# Patient Record
Sex: FEMALE | Race: WHITE | Employment: PART TIME | URBAN - METROPOLITAN AREA
[De-identification: names, ages, dates, MRNs, and addresses within clinical notes are randomized per-mention and may not be internally consistent; named-entity substitution may affect disease eponyms.]

---

## 2019-05-17 PROCEDURE — 87624 HPV HI-RISK TYP POOLED RSLT: CPT | Performed by: OBSTETRICS & GYNECOLOGY

## 2019-05-17 PROCEDURE — 88175 CYTOPATH C/V AUTO FLUID REDO: CPT | Performed by: OBSTETRICS & GYNECOLOGY

## 2024-06-03 ENCOUNTER — OFFICE VISIT (OUTPATIENT)
Facility: CLINIC | Age: 49
End: 2024-06-03
Payer: COMMERCIAL

## 2024-06-03 VITALS
HEIGHT: 67 IN | DIASTOLIC BLOOD PRESSURE: 76 MMHG | BODY MASS INDEX: 26.06 KG/M2 | WEIGHT: 166 LBS | SYSTOLIC BLOOD PRESSURE: 112 MMHG

## 2024-06-03 DIAGNOSIS — Z80.3 FAMILY HISTORY OF BREAST CANCER: ICD-10-CM

## 2024-06-03 DIAGNOSIS — Z12.31 ENCOUNTER FOR SCREENING MAMMOGRAM FOR BREAST CANCER: Primary | ICD-10-CM

## 2024-06-03 DIAGNOSIS — Z12.4 CERVICAL CANCER SCREENING: ICD-10-CM

## 2024-06-03 DIAGNOSIS — Z00.00 ANNUAL PHYSICAL EXAM: ICD-10-CM

## 2024-06-03 PROCEDURE — 87624 HPV HI-RISK TYP POOLED RSLT: CPT | Performed by: OBSTETRICS & GYNECOLOGY

## 2024-06-03 PROCEDURE — 88175 CYTOPATH C/V AUTO FLUID REDO: CPT | Performed by: OBSTETRICS & GYNECOLOGY

## 2024-06-03 NOTE — PROGRESS NOTES
GYN H&P     Genetic questionnaire reviewed with the patient and she will be referred for genetic counseling if the questionnaire had any positive results.    The [facility] intake form was also reviewed regarding contraception, menstrual periods, urinary health, and vaginal / sexual health    6/3/2024  4:29 PM    Chief Complaint   Patient presents with    Physical     No concerns.       HPI: Rosa is a 48 year old  Patient's last menstrual period was 2024 (approximate).  (contraception: condoms) here for her annual gyn exam.     She has no complaints.   Menses are regular. Denies any pelvic or breast complaints.   Satisfied with current contraception.     New to us        Previous encounters and chart reviewed.     OB History    Para Term  AB Living   3 3 3 0 0 3   SAB IAB Ectopic Multiple Live Births   0 0 0 0 3      # Outcome Date GA Lbr Dylan/2nd Weight Sex Type Anes PTL Lv   3 Term 09 39w0d  9 lb 10 oz (4.366 kg) F NORMAL SPONT   JUAN FRANCISCO      Birth Comments: gdm   2 Term 07 39w0d  7 lb 8 oz (3.402 kg) F NORMAL SPONT   JUAN FRANCISCO      Birth Comments: gdm   1 Term 10/27/05 39w0d  7 lb 9 oz (3.43 kg) F NORMAL SPONT   JUAN FRANCISCO      Birth Comments: gdm     Hx of gestational diabetes     GYN hx:   Menarche: 12  Period Cycle (Days): 28  Period Duration (Days): 2-3  Period Flow: light  Use of Birth Control (if yes, specify type): Condoms  Pap Date: 19  Pap Result Notes: neg/neg  Follow Up Recommendation: due      Past Medical History:    ASTHMA    Chronic rhinitis    Extrinsic asthma, unspecified    Gestational diabetes (HCC)    x3    SEASONAL ALLERGIES     Past Surgical History:   Procedure Laterality Date    Other surgical history      left knee acl repair    Tonsillectomy       No Known Allergies  Current Outpatient Medications on File Prior to Visit   Medication Sig Dispense Refill    cetirizine (ZYRTEC) 10 MG Oral Tab Take 1 tablet (10 mg total) by mouth daily.       albuterol (ALBUTEROL) 108 (90 BASE) MCG/ACT Inhalation Aero Soln Inhale 2 Puffs into the lungs every 4 (four) hours as needed for Wheezing. 8.5 g 0    Mometasone Furoate (NASONEX) 50 MCG/ACT Nasal Suspension 2 sprays by Nasal route daily. 17 g 6     No current facility-administered medications on file prior to visit.     Family History   Problem Relation Age of Onset    Hypertension Father     Psychiatric Mother         schizophrenia and dementia, brain lesions    Cancer Maternal Grandfather         brain tumor    Cancer Paternal Grandmother          of colon cancer     Social History     Socioeconomic History    Marital status:      Spouse name: Not on file    Number of children: Not on file    Years of education: Not on file    Highest education level: Not on file   Occupational History    Not on file   Tobacco Use    Smoking status: Never    Smokeless tobacco: Never   Substance and Sexual Activity    Alcohol use: Yes     Comment: Occ    Drug use: No    Sexual activity: Yes     Partners: Male     Birth control/protection: Condom   Other Topics Concern    Not on file   Social History Narrative    Not on file     Social Determinants of Health     Financial Resource Strain: Not on file   Food Insecurity: Not on file   Transportation Needs: Not on file   Physical Activity: Not on file   Stress: Not on file   Social Connections: Not on file   Housing Stability: Not on file       ROS:     Review of Systems:  General: denies fevers, chills, fatigue and malaise.   Eyes: no visual changes, denies headaches  ENT: no complaints, denies earaches, runny nose, epistaxis, throat pain or sore throat  Respiratory: denies SOB, dyspnea, cough or wheezing  Cardiovascular: denies chest pain, palpitations, exercise intolerance   GI: denies abdominal pain, diarrhea, constipation  : no complaints, denies dysuria, increased urinary frequency. Menses regular, no dysmenorrhea, no menorrhagia, no dyspareunia    Hematological/lymphatic: denies history of excessive bleeding or bruising, denies dizziness, lightheadedness.   Breast: denies rashes, skin changes, pain, lumps or discharge   Psychiatric: denies depression, changes in sleep patterns, anxiety  Endocrine: denies hot or cold intolerance, mood changes   Neurological: denies changes in sight, smell, hearing or taste. Denies seizures or tremors  Immunological: denies allergies, denies anaphylaxis, or swollen lymph nodes  Musculoskeletal: denies joint pain, morning stiffness, decreased range of motion         O /76   Ht 67\"   Wt 166 lb (75.3 kg)   LMP 05/27/2024 (Approximate)   BMI 26.00 kg/m²         Wt Readings from Last 6 Encounters:   06/03/24 166 lb (75.3 kg)   05/17/19 155 lb (70.3 kg)   03/23/16 151 lb 12.8 oz (68.9 kg)   09/01/15 148 lb (67.1 kg)   04/12/13 144 lb (65.3 kg)   12/10/12 146 lb (66.2 kg)     Exam:   GENERAL: well developed, well nourished, in no apparent distress, oriented.  SKIN: no rashes, no suspicious lesions  HEENT: normal  NECK: supple; no thyromegaly, no adenopathy  LUNGS: clear to auscultation  CARDIOVASCULAR: normal S1, S2, RRR  BREASTS: soft, nontender, no palpable masses or nodes, no nipple discharge, no skin changes, no axillary adenopathy  ABDOMEN: mp scars,  soft, non distended; non tender, no masses  PELVIC: External Genitalia: Normal appearing, no lesions.    Vagina: normal pink mucosa, no lesions, normal clear discharge.    Bladder well supported.  No  anterior or posterior hernias    Cervix: multiparous, no lesions , No CMT     Uterus: AVAF, mobile, non tender, normal size    Adnexa: non tender, no masses, normal size    Rectal: deferred  EXTREMITIES:  non tender without edema    Component  Ref Range & Units 2/16/23  8:05 AM   GLUCOSE, SERUM  70 - 99 mg/dL 90   BUN  6 - 24 mg/dL 11   CREATININE, SERUM  0.57 - 1.00 mg/dL 0.74   EGFR  >59 mL/min/1.73 100   BUN/CREATININE RATIO  9 - 23 15   SODIUM, SERUM  134 - 144 mmol/L  139   POTASSIUM, SERUM  3.5 - 5.2 mmol/L 4.3   CHLORIDE, SERUM  96 - 106 mmol/L 101   CARBON DIOXIDE, TOTAL  20 - 29 mmol/L 25   CALCIUM, SERUM  8.7 - 10.2 mg/dL 10.2   PROTEIN, TOTAL, SERUM  6.0 - 8.5 g/dL 7.3   ALBUMIN, SERUM  3.8 - 4.8 g/dL 5.1 High    GLOBULIN, TOTAL  1.5 - 4.5 g/dL 2.2   A/G RATIO  1.2 - 2.2 2.3 High    BILIRUBIN, TOTAL  0.0 - 1.2 mg/dL 0.8   ALKALINE PHOSPHATASE, SERUM  44 - 121 IU/L 43 Low    AST (SGOT)  0 - 40 IU/L 16   ALT (SGPT)  0 - 32 IU/L 17       A/P: Patient is 48 year old female with no complaints. Here for well woman exam.            Patient counseled on:    Diet/exercise.      Self Breast Exams     Safe sex practices / and living environment     Vaccines:  Annual Flu, Tdap +/- Gardasil  recommended (up to 45 yrs).      Pneumococcal at 65 yrs old, Shingles at 60 yrs old          Pap: done  GC/Chlamydia:  na  Mammogram:  negative,  2022  Dexa:  na  Colonoscopy / Cologuard:     Lipid / Cholesterol:    Component  Ref Range & Units 2/16/23  8:05 AM   CHOLESTEROL, TOTAL  100 - 199 mg/dL 188   TRIGLYCERIDES  0 - 149 mg/dL 48   HDL CHOLESTEROL  >39 mg/dL 98   VLDL CHOLESTEROL SANAZ  5 - 40 mg/dL 10   LDL CHOL CALC (NIH)  0 - 99 mg/dL 80          Meds This Visit:    Requested Prescriptions      No prescriptions requested or ordered in this encounter       1. Encounter for screening mammogram for breast cancer    2. Cervical cancer screening  - ThinPrep PAP Smear; Future  - Hpv High Risk , Thin Prep Collection; Future    3. Annual physical exam    4. Family history of breast cancer      Return in about 1 year (around 6/3/2025) for Well Woman Exam.    Gagandeep Echols MD   6/3/2024  4:29 PM

## 2024-06-04 LAB — HPV I/H RISK 1 DNA SPEC QL NAA+PROBE: NEGATIVE

## 2024-06-06 LAB
.: NORMAL
.: NORMAL

## 2025-01-28 ENCOUNTER — APPOINTMENT (OUTPATIENT)
Dept: DERMATOLOGY | Age: 50
End: 2025-01-28

## 2025-01-28 DIAGNOSIS — L98.8 RHYTIDES: ICD-10-CM

## 2025-01-28 DIAGNOSIS — L57.0 ACTINIC KERATOSES: Primary | ICD-10-CM

## 2025-01-28 PROCEDURE — 99203 OFFICE O/P NEW LOW 30 MIN: CPT | Performed by: DERMATOLOGY

## 2025-01-28 RX ORDER — CETIRIZINE HYDROCHLORIDE 10 MG/1
1 TABLET ORAL DAILY
COMMUNITY

## 2025-01-28 RX ORDER — FLUOROURACIL 50 MG/G
CREAM TOPICAL
Qty: 40 G | Refills: 0 | Status: SHIPPED | OUTPATIENT
Start: 2025-01-28